# Patient Record
Sex: FEMALE | Race: BLACK OR AFRICAN AMERICAN | Employment: FULL TIME | URBAN - METROPOLITAN AREA
[De-identification: names, ages, dates, MRNs, and addresses within clinical notes are randomized per-mention and may not be internally consistent; named-entity substitution may affect disease eponyms.]

---

## 2017-02-10 ENCOUNTER — HOSPITAL ENCOUNTER (EMERGENCY)
Facility: HOSPITAL | Age: 47
Discharge: HOME | End: 2017-02-10
Attending: EMERGENCY MEDICINE
Payer: MEDICAID

## 2017-02-10 VITALS
TEMPERATURE: 98 F | SYSTOLIC BLOOD PRESSURE: 144 MMHG | OXYGEN SATURATION: 99 % | WEIGHT: 280 LBS | RESPIRATION RATE: 16 BRPM | HEIGHT: 68 IN | DIASTOLIC BLOOD PRESSURE: 82 MMHG | HEART RATE: 78 BPM | BODY MASS INDEX: 42.44 KG/M2

## 2017-02-10 DIAGNOSIS — T16.1XXA FOREIGN BODY IN EAR, RIGHT, INITIAL ENCOUNTER: Primary | ICD-10-CM

## 2017-02-10 PROCEDURE — 99283 EMERGENCY DEPT VISIT LOW MDM: CPT

## 2017-02-10 PROCEDURE — 69200 CLEAR OUTER EAR CANAL: CPT

## 2017-02-10 RX ORDER — IBUPROFEN 600 MG/1
600 TABLET ORAL ONCE
Status: COMPLETED | OUTPATIENT
Start: 2017-02-10 | End: 2017-02-10

## 2017-02-10 NOTE — ED PROVIDER NOTES
Patient Seen in: [facility] Emergency Department    History   Patient presents with:  FB in Ear (otologic)    Stated Complaint: cotton ball stuck in ear    HPI    48-year-old female with a history of sleep apnea comes in after being seen at the [facility] orientated x 4, cooperative, no distress, appropriate for age  Head:  Normocephalic, atraumatic, without obvious abnormality  Eyes:  PERRL, EOM's intact, conjunctiva and cornea clear, normal fundoscopic exam   Ears:  TM pearly gray color,Right + moist cott Medication List    START taking these medications    Neomycin-Colist-HC-Thonzonium (CORTISPORIN-TC) 3.3-3-10-0.5 MG/ML Otic Suspension  Place 3 drops in ear(s) 3 (three) times daily.   Qty: 1 Bottle Refills: 0  Associated Diagnoses:Foreign body in ear, righ

## 2018-02-26 PROCEDURE — 82607 VITAMIN B-12: CPT | Performed by: FAMILY MEDICINE

## 2018-02-26 PROCEDURE — 82746 ASSAY OF FOLIC ACID SERUM: CPT | Performed by: FAMILY MEDICINE

## 2018-03-09 PROBLEM — E66.01 MORBID OBESITY (HCC): Status: ACTIVE | Noted: 2018-03-09

## 2018-03-09 PROBLEM — J01.00 ACUTE NON-RECURRENT MAXILLARY SINUSITIS: Status: ACTIVE | Noted: 2018-03-09

## 2018-08-15 PROBLEM — E66.01 MORBID OBESITY (HCC): Status: RESOLVED | Noted: 2018-03-09 | Resolved: 2018-08-15

## 2020-03-03 PROBLEM — R73.03 PRE-DIABETES: Status: ACTIVE | Noted: 2017-01-30

## 2020-03-03 PROBLEM — G47.33 OSA (OBSTRUCTIVE SLEEP APNEA): Status: ACTIVE | Noted: 2017-01-30

## 2020-03-03 PROBLEM — R63.5 WEIGHT GAIN STATUS POST GASTRIC BYPASS: Status: ACTIVE | Noted: 2017-02-17

## 2020-03-03 PROBLEM — R01.1 SYSTOLIC MURMUR: Status: ACTIVE | Noted: 2017-02-13

## 2020-03-03 PROBLEM — H57.89 MELANOSIS OCULI OF BOTH EYES: Status: ACTIVE | Noted: 2017-03-09

## 2020-03-03 PROBLEM — Z98.84 WEIGHT GAIN STATUS POST GASTRIC BYPASS: Status: ACTIVE | Noted: 2017-02-17

## (undated) NOTE — ED AVS SNAPSHOT
[facility] Emergency Department    [place]  [address] [address]    Phone:  [phone]    Fax:  [phone]           Poonam Arango   MRN: [id number]    Department:  [facility] Emergency Department   Date of Visit:  2/10/20 FOREIGN BODY, EAR CANAL (REMOVED) (ENGLISH)      Disclosure     Insurance plans vary and the physician(s) referred by the ER may not be covered by your plan. Please contact your insurance company to determine coverage for follow-up care and referrals. prescription right away and begin taking the medication(s) as directed    If the emergency physician has read X-rays, these will be re-interpreted by a radiologist.  If there is a significant change in your reading, you will be contacted.  Please make sure Medicaid plans. To get signed up and covered, please call [phone] and ask to get set up for an insurance coverage that is in-network with [facility] Daisy     Sign up for [employer], your secure online medical record.   [employer] [place]

## (undated) NOTE — ED AVS SNAPSHOT
[facility] Emergency Department    [place]  [address] [address]    Phone:  [phone]    Fax:  [phone]           Paul Calderon   MRN: [id number]    Department:  [facility] Emergency Department   Date of Visit:  2/10/20 IF THERE IS ANY CHANGE OR WORSENING OF YOUR CONDITION, CALL YOUR PRIMARY CARE PHYSICIAN AT ONCE OR RETURN IMMEDIATELY TO THE EMERGENCY DEPARTMENT.     If you have been prescribed any medication(s), please fill your prescription right away and begin taking t